# Patient Record
Sex: MALE | Race: WHITE | NOT HISPANIC OR LATINO | URBAN - METROPOLITAN AREA
[De-identification: names, ages, dates, MRNs, and addresses within clinical notes are randomized per-mention and may not be internally consistent; named-entity substitution may affect disease eponyms.]

---

## 2021-02-12 ENCOUNTER — IMMUNIZATIONS (OUTPATIENT)
Dept: FAMILY MEDICINE CLINIC | Facility: HOSPITAL | Age: 66
End: 2021-02-12

## 2021-02-12 DIAGNOSIS — Z23 ENCOUNTER FOR IMMUNIZATION: Primary | ICD-10-CM

## 2021-02-12 PROCEDURE — 0011A SARS-COV-2 / COVID-19 MRNA VACCINE (MODERNA) 100 MCG: CPT

## 2021-02-12 PROCEDURE — 91301 SARS-COV-2 / COVID-19 MRNA VACCINE (MODERNA) 100 MCG: CPT

## 2021-03-10 ENCOUNTER — IMMUNIZATIONS (OUTPATIENT)
Dept: FAMILY MEDICINE CLINIC | Facility: HOSPITAL | Age: 66
End: 2021-03-10

## 2021-03-10 DIAGNOSIS — Z23 ENCOUNTER FOR IMMUNIZATION: Primary | ICD-10-CM

## 2021-03-10 PROCEDURE — 0012A SARS-COV-2 / COVID-19 MRNA VACCINE (MODERNA) 100 MCG: CPT

## 2021-03-10 PROCEDURE — 91301 SARS-COV-2 / COVID-19 MRNA VACCINE (MODERNA) 100 MCG: CPT

## 2023-10-05 PROBLEM — Z12.11 COLON CANCER SCREENING: Status: ACTIVE | Noted: 2023-10-05

## 2023-12-04 PROBLEM — Z12.11 COLON CANCER SCREENING: Status: RESOLVED | Noted: 2023-10-05 | Resolved: 2023-12-04

## 2025-07-10 ENCOUNTER — OFFICE VISIT (OUTPATIENT)
Age: 70
End: 2025-07-10

## 2025-07-10 VITALS — HEIGHT: 68 IN | BODY MASS INDEX: 25.91 KG/M2 | WEIGHT: 171 LBS

## 2025-07-10 DIAGNOSIS — M47.816 FACET SYNDROME, LUMBAR: ICD-10-CM

## 2025-07-10 DIAGNOSIS — M53.3 SACROILIAC JOINT DYSFUNCTION: ICD-10-CM

## 2025-07-10 DIAGNOSIS — G89.29 CHRONIC BILATERAL LOW BACK PAIN WITHOUT SCIATICA: Primary | ICD-10-CM

## 2025-07-10 DIAGNOSIS — M54.50 CHRONIC BILATERAL LOW BACK PAIN WITHOUT SCIATICA: Primary | ICD-10-CM

## 2025-07-10 RX ORDER — ALPRAZOLAM 0.25 MG
TABLET ORAL
COMMUNITY
Start: 2025-06-16

## 2025-07-10 RX ORDER — ATENOLOL 25 MG/1
25 TABLET ORAL DAILY
COMMUNITY
Start: 2025-01-15

## 2025-07-10 RX ORDER — TRAMADOL HYDROCHLORIDE 50 MG/1
50 TABLET ORAL EVERY 6 HOURS PRN
COMMUNITY
Start: 2025-05-07

## 2025-07-10 RX ORDER — METHOCARBAMOL 500 MG/1
500 TABLET, FILM COATED ORAL 4 TIMES DAILY PRN
COMMUNITY
Start: 2025-04-14 | End: 2028-01-08

## 2025-07-10 RX ORDER — METHOCARBAMOL 750 MG/1
600 TABLET, FILM COATED ORAL
COMMUNITY
Start: 2025-04-14

## 2025-07-10 RX ORDER — ATORVASTATIN CALCIUM 40 MG/1
40 TABLET, FILM COATED ORAL DAILY
COMMUNITY
Start: 2025-05-07 | End: 2026-05-07

## 2025-07-10 RX ORDER — CYCLOBENZAPRINE HCL 10 MG
10 TABLET ORAL 3 TIMES DAILY PRN
COMMUNITY
Start: 2025-04-05

## 2025-07-10 RX ORDER — PREDNISONE 20 MG/1
1 TABLET ORAL 2 TIMES DAILY
COMMUNITY
Start: 2025-04-05

## 2025-07-10 NOTE — PROGRESS NOTES
Assessment/Plan:       1. Chronic bilateral low back pain without sciatica        2. Facet syndrome, lumbar        3. Sacroiliac joint dysfunction          Review of Diagnostic Studies and Office Notes:    No results found for this or any previous visit.    Patient states he has an order to get x-rays done of the lumbar spine from Dr. Woodson at [facility] but has not gotten them done yet.      Decision and Treatment Plan:    I reviewed my findings with the patient today.  Patient was interested in receiving chiropractic care and was treated today.  We will treat the patient 2x/week for the next three weeks and re-evaluate. If there is improvement in symptoms and objective findings, frequency will be reduced.       The patient will be treated with conservative chiropractic care including myofascial release, joint mobilization, and a home stretching and icing program.    The patient was taught a stretch today. The patient was advised to do the stretch for 3 sets of 15-20 seconds several times per day.The need to stretch to the point of tension only was discussed.    Patient Instructions:    Ice 15 minutes as needed for posttreatment soreness.    Reviewed stretching to be done at home twice daily.    Return for treatment 2 times per week.    TIME/Reviewed with Patient:    At least 33 minutes of time was spent with the patient during the consultation including the patient's history/current complaints, physical exam, reviewing the diagnostic report, reviewing home instruction/reducing risk factors with the patient, reviewing my findings with the patient, and discussing the treatment with the patient.     This is a separate identifiable portion of today's visit from the E and M code billed.    Treatment: Myofascial release was performed to lumbar paraspinals, quadratus lumborum and right gluteus medius.    Manipulations performed to the right SI joint, L5-S1, L4-5 utilizing side-lying flexion distraction.  No HVLA was  performed..    Subjective:      Nicanor Pruitt is a 70 y.o. male presents today for chiropractic evaluation and possible treatment.  He has a chief complaint of lower back pain which is right-sided.  He has had right sided lower back pain on and off over the years.  I did treat him many years ago for right-sided lower back pain.  He has had a few episodes of severe pain.  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  He states he went to a massage therapist regularly and that helped get him through the year of coaching.  He states doing the underhand tosses and pitches was the worst in terms of the back pain.  Now that he is done coaching ability to see if he can get to a better place regarding his back pain.  He did see his PCP 1 point and had a cortisone injection in the lower back that was done in the office.  States it did help.  He has had a few episodes over the years where he took steroid tapers and was taking a lot of over-the-counter medication as well as methocarbamol.  He states he has issues with his kidneys so has tried to now cut back on all his medication except for occasional Tylenol.  He does have some pain into the right buttock but no pain radiating down the leg.  He denies numbness or tingling in the lower extremities.  He states he has a tight pain most of the time but then it becomes sharp at times with certain movements and activities.  He does feel it in the mornings.  He states he has a neighbor who his pain management doctor and has given him methocarbamol over the years.  He states an order was put in for x-rays of the lumbar spine but he has not gotten them done yet.  This was through [facility].    He denies fever, chills, night sweats, recent unexplained weight loss, changes in bowel/bladder habits, urinary incontinence or retention.    Objective:      Appearance: Well developed, well nourished, and in no acute distress    Alert and oriented  x 3    Mood/Affect: calm and pleasant    Gait/Posture:    Gait: Normal    Antalgic posture: None    Lordosis: Lumbar- normal    Kyphosis: Thoracic- normal    Lower extremity reflexes:    Deep tendon reflexes were normal and symmetrical in the bilateral lower extremities.    Lower Extremity Muscle Testing:    Muscle testing of the bilateral lower extremities was normal and graded +5/5.    Sensory:    Sensation to light touch was normal and symmetrical in the bilateral lower extremities.    Babinski was negative bilaterally.    Lumbar Orthopedic Tests:    Supine Straight Leg Raise was negative  on the Right.    Supine Straight Leg Raise was negative  on the Left.    Navdeep Test was positive bilaterally.    Facet loading was productive lower back pain on the right, negative on the left.    Active Lumbar Ranges of Motion:    Lumbar range of motion examination shows flexion to be 25% restricted with pulling noted at the end range.  Extension is 25% restricted with pain.  Right lateral bending is 25% restricted with pain.  Left lateral bending is 20% restricted pulling noted on the right side.    Palpation:    Moderate spasm and tenderness is noted in the lumbar paraspinals, right quadratus lumborum, right gluteus medius.    Mild pelvic obliquity is noted with the right innominate being posterior.  Joint dysfunction is present to the right sacroiliac joint, L5-S1, L4-5, T9-10.  Tenderness is noted at the right SI joint and the right lower lumbar facet joints.    Dictation voice to text software has been used in the creation of this document. Please consider this in light of any contextual or grammatical errors.

## 2025-07-18 ENCOUNTER — PROCEDURE VISIT (OUTPATIENT)
Age: 70
End: 2025-07-18
Payer: MEDICARE

## 2025-07-18 VITALS — HEIGHT: 68 IN | WEIGHT: 171 LBS | BODY MASS INDEX: 25.91 KG/M2

## 2025-07-18 DIAGNOSIS — M54.50 CHRONIC BILATERAL LOW BACK PAIN WITHOUT SCIATICA: ICD-10-CM

## 2025-07-18 DIAGNOSIS — M99.04 SEGMENTAL DYSFUNCTION OF SACRAL REGION: ICD-10-CM

## 2025-07-18 DIAGNOSIS — M47.816 FACET SYNDROME, LUMBAR: ICD-10-CM

## 2025-07-18 DIAGNOSIS — M99.03 SEGMENTAL DYSFUNCTION OF LUMBAR REGION: Primary | ICD-10-CM

## 2025-07-18 DIAGNOSIS — M99.05 SEGMENTAL DYSFUNCTION OF PELVIC REGION: ICD-10-CM

## 2025-07-18 DIAGNOSIS — G89.29 CHRONIC BILATERAL LOW BACK PAIN WITHOUT SCIATICA: ICD-10-CM

## 2025-07-18 PROCEDURE — 98941 CHIROPRACT MANJ 3-4 REGIONS: CPT | Performed by: CHIROPRACTOR

## 2025-07-18 NOTE — PROGRESS NOTES
Assessment:     1. Segmental dysfunction of lumbar region        2. Chronic bilateral low back pain without sciatica        3. Facet syndrome, lumbar        4. Segmental dysfunction of sacral region        5. Segmental dysfunction of pelvic region            Condition is Improving.    PLAN/TREATMENT:    Return for treatment twice next week.     Continue using ice as needed   Continue stretching.    Treatment:  Myofascial release was performed to lumbar paraspinals, quadratus lumborum and right gluteus medius.     Manipulations performed to the right SI joint, L5-S1, L4-5 utilizing side-lying flexion distraction.  No HVLA was performed..    Subjective:  Ed is here today. He felt better for 2-3 days after last visit.  Pain is returning.  Feels better overall still.   Didn't get x-rays done yet.     Objective:  Moderate spasm and tenderness is noted in the lumbar paraspinals, right quadratus lumborum, right gluteus medius.     Mild pelvic obliquity is noted with the right innominate being posterior.  Joint dysfunction is present to the right sacroiliac joint, L5-S1, L4-5, T9-10.  Tenderness is noted at the right SI joint and the right lower lumbar facet joints.

## 2025-07-21 ENCOUNTER — PROCEDURE VISIT (OUTPATIENT)
Age: 70
End: 2025-07-21
Payer: MEDICARE

## 2025-07-21 VITALS — WEIGHT: 171 LBS | BODY MASS INDEX: 25.91 KG/M2 | HEIGHT: 68 IN

## 2025-07-21 DIAGNOSIS — M99.04 SEGMENTAL DYSFUNCTION OF SACRAL REGION: ICD-10-CM

## 2025-07-21 DIAGNOSIS — G89.29 CHRONIC BILATERAL LOW BACK PAIN WITHOUT SCIATICA: ICD-10-CM

## 2025-07-21 DIAGNOSIS — M99.03 SEGMENTAL DYSFUNCTION OF LUMBAR REGION: Primary | ICD-10-CM

## 2025-07-21 DIAGNOSIS — M47.816 FACET SYNDROME, LUMBAR: ICD-10-CM

## 2025-07-21 DIAGNOSIS — M99.05 SEGMENTAL DYSFUNCTION OF PELVIC REGION: ICD-10-CM

## 2025-07-21 DIAGNOSIS — M53.3 SACROILIAC JOINT DYSFUNCTION: ICD-10-CM

## 2025-07-21 DIAGNOSIS — M54.50 CHRONIC BILATERAL LOW BACK PAIN WITHOUT SCIATICA: ICD-10-CM

## 2025-07-21 PROCEDURE — 98941 CHIROPRACT MANJ 3-4 REGIONS: CPT | Performed by: CHIROPRACTOR

## 2025-07-21 NOTE — PROGRESS NOTES
Assessment:     1. Segmental dysfunction of lumbar region        2. Chronic bilateral low back pain without sciatica        3. Facet syndrome, lumbar        4. Segmental dysfunction of sacral region        5. Segmental dysfunction of pelvic region        6. Sacroiliac joint dysfunction            Condition is Improving.    PLAN/TREATMENT:    Return for treatment later this week.     Continue using ice as needed   Continue stretching.    Treatment:  Myofascial release was performed to lumbar paraspinals, quadratus lumborum and right gluteus medius.     Manipulation was performed to the right SI joint, L5-S1, L4-5 utilizing side-lying flexion distraction.  No HVLA was performed..    Subjective:  Ed is here today with an ongoing complaint of LBP.  Feeling a little better.  Not having pain but mainly tightness and stiffness now.     Objective:  Mild/moderate spasm and tenderness is noted in the lumbar paraspinals, right quadratus lumborum, right gluteus medius.     Mild pelvic obliquity is noted with the right innominate being posterior.  Joint dysfunction is present to the right sacroiliac joint, L5-S1, L4-5, T9-10.  Tenderness is noted at the right SI joint and the right lower lumbar facet joints.

## 2025-07-25 ENCOUNTER — PROCEDURE VISIT (OUTPATIENT)
Age: 70
End: 2025-07-25
Payer: MEDICARE

## 2025-07-25 VITALS — WEIGHT: 171 LBS | BODY MASS INDEX: 25.91 KG/M2 | HEIGHT: 68 IN

## 2025-07-25 DIAGNOSIS — M99.05 SEGMENTAL DYSFUNCTION OF PELVIC REGION: ICD-10-CM

## 2025-07-25 DIAGNOSIS — M47.816 FACET SYNDROME, LUMBAR: ICD-10-CM

## 2025-07-25 DIAGNOSIS — M54.50 CHRONIC BILATERAL LOW BACK PAIN WITHOUT SCIATICA: ICD-10-CM

## 2025-07-25 DIAGNOSIS — M99.03 SEGMENTAL DYSFUNCTION OF LUMBAR REGION: Primary | ICD-10-CM

## 2025-07-25 DIAGNOSIS — M99.04 SEGMENTAL DYSFUNCTION OF SACRAL REGION: ICD-10-CM

## 2025-07-25 DIAGNOSIS — M53.3 SACROILIAC JOINT DYSFUNCTION: ICD-10-CM

## 2025-07-25 DIAGNOSIS — G89.29 CHRONIC BILATERAL LOW BACK PAIN WITHOUT SCIATICA: ICD-10-CM

## 2025-07-25 PROCEDURE — 98941 CHIROPRACT MANJ 3-4 REGIONS: CPT | Performed by: CHIROPRACTOR

## 2025-07-25 NOTE — PROGRESS NOTES
Initial chiropractic visit--7/10/25, visit #4    Acute corrective treatment     Assessment:     1. Segmental dysfunction of lumbar region        2. Chronic bilateral low back pain without sciatica        3. Facet syndrome, lumbar        4. Segmental dysfunction of sacral region        5. Segmental dysfunction of pelvic region        6. Sacroiliac joint dysfunction              PLAN/TREATMENT:    Return for treatment once next week.     Continue using ice as needed   Continue stretching.    Treatment:  Myofascial release was performed to lumbar paraspinals, quadratus lumborum and right gluteus medius.     Manipulation was performed to the right SI joint, L5-S1, L4-5 utilizing side-lying flexion distraction.  No HVLA was performed..    Subjective:  Ed is here today with an ongoing complaint of LBP.  He has been painting his son's house and notes that the up-and-down movement of the lower back has been painful but side-to-side does not cause pain.  He has yet to get the x-rays of the lumbar spine.    Objective:  Mild/moderate spasm and tenderness is noted in the lumbar paraspinals, right quadratus lumborum, right gluteus medius.     Mild pelvic obliquity is noted with the right innominate being posterior.  Joint dysfunction is present to the right sacroiliac joint, L5-S1, L4-5, T9-10.  Tenderness is noted at the right SI joint and the right lower lumbar facet joints.

## 2025-08-05 ENCOUNTER — PROCEDURE VISIT (OUTPATIENT)
Age: 70
End: 2025-08-05
Payer: MEDICARE

## 2025-08-05 VITALS — HEIGHT: 68 IN | BODY MASS INDEX: 25.91 KG/M2 | WEIGHT: 171 LBS

## 2025-08-05 DIAGNOSIS — M54.50 CHRONIC BILATERAL LOW BACK PAIN WITHOUT SCIATICA: ICD-10-CM

## 2025-08-05 DIAGNOSIS — M99.05 SEGMENTAL DYSFUNCTION OF PELVIC REGION: ICD-10-CM

## 2025-08-05 DIAGNOSIS — M47.816 FACET SYNDROME, LUMBAR: ICD-10-CM

## 2025-08-05 DIAGNOSIS — M99.03 SEGMENTAL DYSFUNCTION OF LUMBAR REGION: Primary | ICD-10-CM

## 2025-08-05 DIAGNOSIS — G89.29 CHRONIC BILATERAL LOW BACK PAIN WITHOUT SCIATICA: ICD-10-CM

## 2025-08-05 DIAGNOSIS — M99.04 SEGMENTAL DYSFUNCTION OF SACRAL REGION: ICD-10-CM

## 2025-08-05 DIAGNOSIS — M53.3 SACROILIAC JOINT DYSFUNCTION: ICD-10-CM

## 2025-08-05 PROCEDURE — 98941 CHIROPRACT MANJ 3-4 REGIONS: CPT | Performed by: CHIROPRACTOR

## 2025-08-12 ENCOUNTER — PROCEDURE VISIT (OUTPATIENT)
Age: 70
End: 2025-08-12
Payer: MEDICARE